# Patient Record
Sex: FEMALE | Race: WHITE | HISPANIC OR LATINO | URBAN - METROPOLITAN AREA
[De-identification: names, ages, dates, MRNs, and addresses within clinical notes are randomized per-mention and may not be internally consistent; named-entity substitution may affect disease eponyms.]

---

## 2024-09-03 ENCOUNTER — TELEPHONE (OUTPATIENT)
Dept: OBSTETRICS AND GYNECOLOGY | Facility: CLINIC | Age: 24
End: 2024-09-03
Payer: MEDICAID

## 2024-09-03 NOTE — TELEPHONE ENCOUNTER
Spoke with pt and got her scheduled to see dr hill for pregnancy conf sept 30th at 1:45pm     Pt verbalized understanding and accepted time and date.   ----- Message from Crys Hamilton sent at 9/3/2024  9:44 AM CDT -----  Needs advice from nurse:      Who Called:pt  Regarding:positive pregnancy test/date of last period was 7/2  Would the patient rather a call back or [facility]IA [facility]?  Best Call Back number:[phone]  Additional Info: